# Patient Record
Sex: MALE | Race: WHITE | Employment: OTHER | URBAN - METROPOLITAN AREA
[De-identification: names, ages, dates, MRNs, and addresses within clinical notes are randomized per-mention and may not be internally consistent; named-entity substitution may affect disease eponyms.]

---

## 2018-06-02 ENCOUNTER — OFFICE VISIT (OUTPATIENT)
Dept: URGENT CARE | Facility: RETAIL CLINIC | Age: 43
End: 2018-06-02
Payer: COMMERCIAL

## 2018-06-02 VITALS — TEMPERATURE: 98.3 F | SYSTOLIC BLOOD PRESSURE: 152 MMHG | HEART RATE: 91 BPM | DIASTOLIC BLOOD PRESSURE: 102 MMHG

## 2018-06-02 DIAGNOSIS — S30.861A TICK BITE OF ABDOMEN, INITIAL ENCOUNTER: ICD-10-CM
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
## 2018-06-02 DIAGNOSIS — L08.9 SKIN INFECTION: Primary | ICD-10-CM

## 2018-06-02 DIAGNOSIS — L30.9 DERMATITIS: ICD-10-CM

## 2018-06-02 PROCEDURE — 99203 OFFICE O/P NEW LOW 30 MIN: CPT | Performed by: PHYSICIAN ASSISTANT

## 2018-06-02 RX ORDER — DIAPER,BRIEF,INFANT-TODD,DISP
EACH MISCELLANEOUS 2 TIMES DAILY
COMMUNITY
End: 2021-01-20

## 2018-06-02 RX ORDER — DOXYCYCLINE 100 MG/1
100 CAPSULE ORAL 2 TIMES DAILY
Qty: 20 CAPSULE | Refills: 0 | Status: SHIPPED | OUTPATIENT
Start: 2018-06-02 | End: 2018-06-12

## 2018-06-02 NOTE — PROGRESS NOTES
Chief Complaint   Patient presents with     Insect Bites     found tick inside navel attached monday, now rash around navel since this morning, intermittant itchy rashes since may 5th, no fevers        SUBJECTIVE:  Junior Manzano is a 42 year old male who is here because of a tick bite. He stated it was a large tick/wood tick, not a deer tick. Was attached inside his navel 5 days ago and removed it. [place] had [place] the few days prior (in [facility])  He then noticed a flat red rash in and around his navel since this morning. Does not itch    He has had issues with intermittent itchy pink or red rashes appearing on and off for several days then disappearing on random locations for past 3 weeks on his legs. Appear as raised bumpy grouping. Not sure if related to allergies or not.  Uses topical cortisone and this helps the itch somewhat. No joint aches, body aches, fatigue, weight loss, cough, GI symptoms.    Associated symptoms:  Fever: none  Other symptoms:   Headache no  Sore throat no  Fatigue no  Nausea/vomiting no  Muscle aches no  Joint pain no  Swollen lymph glands no  Stiff neck no      No chronic health issues    Past Medical History:   Diagnosis Date     Anxiety         Current Outpatient Prescriptions   Medication Sig Dispense Refill     hydrocortisone (CORTIZONE-10) 1 % ointment Apply topically 2 times daily          ROS:  CONSTITUTIONAL NEG for: fatigue, fever, malaise or myalgias  ENT/ MOUTH: Neg for ears, nose or throat problems   RESP: Neg for cough or wheezing  GI: Neg for diarrhea or abd pain    OBJECTIVE:  Nontoxic male in no acute distress.  Blood pressure (!) 152/102, pulse 91, temperature 98.3  F (36.8  C), temperature source Oral.  Rash description: #2     Location: umbilicus and surrounding area     Distribution: localized     Color: erythema     Maximum diameter  5cm x 3cm    ASSESSMENT / PLAN  (L08.9) Skin infection  (primary encounter  diagnosis)  (S30.861A,  W57.XXXA) Tick bite of abdomen, initial encounter  Plan: doxycycline (VIBRAMYCIN) 100 MG capsule  Take oral antibiotic 2x a day for 10 days with food  Use sunscreen for 10 days  Please follow up with primary care provider if not improving, worsening or new symptoms or for any adverse reactions to medications.     (L30.9) Dermatitis, history of, on legs  Plan: Hydrocortisone Acetate 2.5 % CREA  Use steroid cream as needed for itchy areas twice daily as needed  Please follow up with primary care provider if not improving, worsening or new symptoms or for any adverse reactions to medications.     Ivon Layton PA-C  [facility] [place] [facility]

## 2018-06-02 NOTE — MR AVS SNAPSHOT
After Visit Summary   6/2/2018    Junior Manzano    MRN: [id number]           Patient Information     Date Of Birth          1975        Visit Information        Provider Department      6/2/2018 11:40 AM Ivon Layton PA-C [facility]        Today's Diagnoses     Skin infection    -  1    Dermatitis          Care Instructions    Take oral antibiotic 2x a day for 10 days with food  Use sunscreen for 10 days    Use steroid cream as needed for itchy areas twice daily as needed  Please follow up with primary care provider if not improving, worsening or new symptoms or for any adverse reactions to medications.             Follow-ups after your visit        Who to contact     You can reach your care team any time of the day by calling [phone].  Notification of test results:  If you have an abnormal lab result, we will notify you by phone as soon as possible.         Additional Information About Your Visit        Care EveryWhere ID     This is your Care EveryWhere ID. This could be used by other organizations to access your [facility] medical records  [id number]        Your Vitals Were     Pulse Temperature                91 98.3  F (36.8  C) (Oral)           Blood Pressure from Last 3 Encounters:   06/02/18 (!) 152/102   06/22/16 126/70   04/04/16 (!) 148/102    Weight from Last 3 Encounters:   06/22/16 201 lb (91.2 kg)   04/04/16 195 lb (88.5 kg)   04/20/15 205 lb (93 kg)              Today, you had the following     No orders found for display         Today's Medication Changes          These changes are accurate as of 6/2/18 12:09 PM.  If you have any questions, ask your nurse or doctor.               Start taking these medicines.        Dose/Directions    doxycycline 100 MG capsule   Commonly known as:  VIBRAMYCIN   Used for:  Skin infection        Dose:  100 mg   Take 1 capsule (100 mg) by mouth 2 times daily for 10 days   Quantity:  20 capsule   Refills:  0        Hydrocortisone Acetate 2.5 % Crea   Used for:  Dermatitis        Dose:  1 Dose   Externally apply 1 Dose topically as needed Apply thin amt to affected areas twice daily as needed for itch   Quantity:  30 g   Refills:  0            Where to get your medicines      These medications were sent to [id number] - [place], [place] - [address]  [address] [address]     Phone:  [phone]     doxycycline 100 MG capsule    Hydrocortisone Acetate 2.5 % Crea                Primary Care Provider Office Phone # Fax #    Giovana Calderon PA-C [phone] [address] [place] [address]        Equal Access to Services     [facility]: Hadii aad bina hadasho Sojaneeali, waaxda luqadaha, qaybta kaalmada adeegyada, angel norman . So [facility] [phone].    ATENCIÓN: Si habla español, tiene a meadows disposición servicios gratuitos de asistencia lingüística. [facility] [phone].    We comply with applicable federal civil rights laws and [place] laws. We do not discriminate on the basis of race, color, national origin, age, disability, sex, sexual orientation, or gender identity.            Thank you!     Thank you for choosing [facility]  for your care. Our goal is always to provide you with excellent care. Hearing back from our patients is one way we can continue to improve our services. Please take a few minutes to complete the written survey that you may receive in the mail after your visit with us. Thank you!             Your Updated Medication List - Protect others around you: Learn how to safely use, store and throw away your medicines at www.disposemymeds.org.          This list is accurate as of 6/2/18 12:09 PM.  Always use your most recent med list.                   Brand Name Dispense Instructions for use Diagnosis    CORTIZONE-10 1 % ointment   Generic drug:  hydrocortisone      Apply topically 2 times daily         doxycycline 100 MG capsule    VIBRAMYCIN    20 capsule    Take 1 capsule (100 mg) by mouth 2 times daily for 10 days    Skin infection       Hydrocortisone Acetate 2.5 % Crea     30 g    Externally apply 1 Dose topically as needed Apply thin amt to affected areas twice daily as needed for itch    Dermatitis

## 2018-06-02 NOTE — PATIENT INSTRUCTIONS
Take oral antibiotic 2x a day for 10 days with food  Use sunscreen for 10 days    Use steroid cream as needed for itchy areas twice daily as needed  Please follow up with primary care provider if not improving, worsening or new symptoms or for any adverse reactions to medications.

## 2018-12-19 ENCOUNTER — OFFICE VISIT (OUTPATIENT)
Dept: URGENT CARE | Facility: RETAIL CLINIC | Age: 43
End: 2018-12-19
Payer: COMMERCIAL

## 2018-12-19 VITALS — TEMPERATURE: 98.2 F | SYSTOLIC BLOOD PRESSURE: 183 MMHG | DIASTOLIC BLOOD PRESSURE: 108 MMHG | HEART RATE: 91 BPM

## 2018-12-19 DIAGNOSIS — J02.9 ACUTE PHARYNGITIS, UNSPECIFIED ETIOLOGY: Primary | ICD-10-CM

## 2018-12-19 LAB — S PYO AG THROAT QL IA.RAPID: NEGATIVE

## 2018-12-19 PROCEDURE — 87081 CULTURE SCREEN ONLY: CPT | Performed by: FAMILY MEDICINE

## 2018-12-19 PROCEDURE — 87880 STREP A ASSAY W/OPTIC: CPT | Mod: QW | Performed by: FAMILY MEDICINE

## 2018-12-19 PROCEDURE — 99213 OFFICE O/P EST LOW 20 MIN: CPT | Performed by: FAMILY MEDICINE

## 2018-12-19 RX ORDER — PENICILLIN V POTASSIUM 500 MG/1
500 TABLET, FILM COATED ORAL 3 TIMES DAILY
Qty: 30 TABLET | Refills: 0 | Status: SHIPPED | OUTPATIENT
Start: 2018-12-19 | End: 2018-12-29

## 2018-12-19 NOTE — PROGRESS NOTES
SUBJECTIVE:  Junior Manzano is a 43 year old male with a chief complaint of sore throat.  Onset of symptoms was 3 day(s) ago.    Course of illness: still present and improving.  Severity mild  Current and Associated symptoms: rash in axilla  Treatment measures tried include None tried.  Predisposing factors include traveling, low sleep and crowds..    Past Medical History:   Diagnosis Date     Anxiety      Current Outpatient Medications   Medication Sig Dispense Refill     penicillin V (VEETID) 500 MG tablet Take 1 tablet (500 mg) by mouth 3 times daily for 10 days 30 tablet 0     RaNITidine HCl (ZANTAC PO)        hydrocortisone (CORTIZONE-10) 1 % ointment Apply topically 2 times daily       Hydrocortisone Acetate 2.5 % CREA Externally apply 1 Dose topically as needed Apply thin amt to affected areas twice daily as needed for itch (Patient not taking: Reported on 12/19/2018) 30 g 0     History   Smoking Status     Never Smoker   Smokeless Tobacco     Never Used       ROS:  Review of systems negative except as stated above.    OBJECTIVE:   BP (!) 183/108 (BP Location: Left arm)   Pulse 91   Temp 98.2  F (36.8  C) (Oral)   GENERAL APPEARANCE: healthy, alert and no distress  EYES: EOMI,  PERRL, conjunctiva clear  HENT: ear canals and TM's normal.  Nose normal.  Pharynx erythematous with some exudate noted.  NECK: supple, non-tender to palpation, no adenopathy noted  RESP: lungs clear to auscultation - no rales, rhonchi or wheezes  CV: regular rates and rhythm, normal S1 S2, no murmur noted  ABDOMEN:  soft, nontender, no HSM or masses and bowel sounds normal  SKIN: folliculitis    Rapid Strep test is negative; await throat culture results.    ASSESSMENT:  Acute pharyngitis, unspecified etiology  Mild folliculitis  Elevated BP today  PLAN: Advised monitor BP, antibiotic oint to folliculitis, Printed Rx for Pen because traveling tomorrow.    Will call with TC results  Symptomatic treat with gargles, lozenges, and  OTC analgesic as needed.   Follow-up with primary care provider if not improving.

## 2018-12-25 LAB
BACTERIA SPEC CULT: NORMAL
SPECIMEN SOURCE: NORMAL

## 2019-09-06 NOTE — PROGRESS NOTES
SUBJECTIVE:   CC: Junior Manzano is an 44 year old male who presents for preventative health visit.     Healthy Habits:     Getting at least 3 servings of Calcium per day:  Yes    Bi-annual eye exam:  Yes    Dental care twice a year:  NO    Sleep apnea or symptoms of sleep apnea:  None    Diet:  Regular (no restrictions)    Frequency of exercise:  2-3 days/week    Duration of exercise:  45-60 minutes    Taking medications regularly:  Not Applicable    Medication side effects:  Not applicable    PHQ-2 Total Score: 0    Additional concerns today:  Yes (discuss BP-has been 130-140s/90s at home, daily aches)    Has had BP elevated intermittently over the years, he thinks stress is a contributing factor, 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  He did have an blood pressure as high as 183/108 earlier this year while in [place].  He has no family history of hypertension per se though his father did require stents recently.  He does not add a lot of salt to his food but does eat out most days of the of the week for the noon meal.  He tries to make good choices.  No substernal, exertional chest pain or shortness of breath.    He noticed a lump on the left rib cage several months ago.  It is getting smaller and it is not painful.  He would like it checked.  His chiropractor thinks it might be a lymph node.                Today's PHQ-2 Score:   PHQ-2 ( 1999 Pfizer) 9/10/2019   Q1: Little interest or pleasure in doing things 0   Q2: Feeling down, depressed or hopeless 0   PHQ-2 Score 0   Q1: Little interest or pleasure in doing things Not at all   Q2: Feeling down, depressed or hopeless Not at all   PHQ-2 Score 0       Abuse: Current or Past(Physical, Sexual or Emotional)- No  Do you feel safe in your environment? Yes    Social History     Tobacco Use     Smoking status: Never Smoker     Smokeless tobacco: Never Used   Substance Use Topics     Alcohol use: Yes     Alcohol/week: 3.0 oz         Alcohol  "Use 9/10/2019   Prescreen: >3 drinks/day or >7 drinks/week? Yes   AUDIT SCORE  6       Last PSA: No results found for: PSA    Reviewed orders with patient. Reviewed health maintenance and updated orders accordingly - Yes  Lab work is in process  Labs reviewed in EPIC  BP Readings from Last 3 Encounters:   09/11/19 (!) 150/90   12/19/18 (!) 183/108   06/02/18 (!) 152/102    Wt Readings from Last 3 Encounters:   09/11/19 88.9 kg (196 lb)   06/22/16 91.2 kg (201 lb)   04/04/16 88.5 kg (195 lb)                    Reviewed and updated as needed this visit by clinical staff  Tobacco  Allergies  Meds         Reviewed and updated as needed this visit by Provider            Review of Systems  CONSTITUTIONAL: NEGATIVE for fever, chills, change in weight  INTEGUMENTARY/SKIN: Lump on left side as above  EYES: NEGATIVE for vision changes or irritation  ENT: NEGATIVE for ear, mouth and throat problems  RESP: NEGATIVE for significant cough or SOB  CV: NEGATIVE for chest pain, palpitations or peripheral edema  GI: Occasional heartburn, particularly if he drinks soda or beer.   male: negative for dysuria, hematuria, decreased urinary stream, erectile dysfunction, urethral discharge  MUSCULOSKELETAL: He does work physically hard at times, he does get some shoulder pain and stiffness of his chest muscles or upper back  NEURO: NEGATIVE for weakness, dizziness or paresthesias  PSYCHIATRIC: He continues to be anxious, he has a lot of stress managing his own business.  We have tried Zoloft and Lexapro in the past with very little improvement.  Currently he uses alcohol few drinks a few times a week to help him relax.  He understands this is not the best treatment    OBJECTIVE:   BP (!) 150/90   Pulse 92   Temp 97.5  F (36.4  C) (Temporal)   Resp 16   Ht 1.715 m (5' 7.5\")   Wt 88.9 kg (196 lb)   BMI 30.24 kg/m      Physical Exam  GENERAL: healthy, alert and no distress  EYES: Eyes grossly normal to inspection, PERRL and " "conjunctivae and sclerae normal  HENT: ear canals and TM's normal, nose and mouth without ulcers or lesions  NECK: no adenopathy, no asymmetry, masses, or scars and thyroid normal to palpation  RESP: lungs clear to auscultation - no rales, rhonchi or wheezes  CV: regular rate and rhythm, normal S1 S2, no S3 or S4, no murmur, click or rub, no peripheral edema and peripheral pulses strong  ABDOMEN: soft, nontender, no hepatosplenomegaly, no masses and bowel sounds normal  MS: no gross musculoskeletal defects noted, no edema  SKIN: Left flank subcutaneous 1 cm in diameter ill-defined mass overlying skin is normal  NEURO: Normal strength and tone, mentation intact and speech normal  PSYCH: mentation appears normal, affect normal/bright    Diagnostic Test Results:  Labs reviewed in Epic  No results found for this or any previous visit (from the past 24 hour(s)).    ASSESSMENT/PLAN:   1. Routine general medical examination at a health care facility  Healthy male with elevated blood pressure  - CBC with platelets    2. Hypertension goal BP (blood pressure) < 140/90  New diagnosis, we will start with low-dose lisinopril, discussed common side effects.  He will have a blood pressure recheck in the pharmacy in 2 weeks he will continue to check blood pressures at home as well    Recheck in 6 months  - Basic metabolic panel  - lisinopril (PRINIVIL/ZESTRIL) 5 MG tablet; Take 1 tablet (5 mg) by mouth daily  Dispense: 30 tablet; Refill: 5    3. CARDIOVASCULAR SCREENING; LDL GOAL LESS THAN 160    - Lipid panel reflex to direct LDL Fasting    4. Lipoma of torso  He will observe, he needs follow-up with this is getting larger or becoming painful      COUNSELING:   Reviewed preventive health counseling, as reflected in patient instructions    Estimated body mass index is 30.24 kg/m  as calculated from the following:    Height as of this encounter: 1.715 m (5' 7.5\").    Weight as of this encounter: 88.9 kg (196 lb).     Weight " management plan: Discussed healthy diet and exercise guidelines     reports that he has never smoked. He has never used smokeless tobacco.      Counseling Resources:  ATP IV Guidelines  Pooled Cohorts Equation Calculator  FRAX Risk Assessment  ICSI Preventive Guidelines  Dietary Guidelines for Americans, 2010  USDA's MyPlate  ASA Prophylaxis  Lung CA Screening    Giovana Calderon PA-C  [facility] [place]  Answers for HPI/ROS submitted by the patient on 9/10/2019   Annual Exam:  If you checked off any problems, how difficult have these problems made it for you to do your work, take care of things at home, or get along with other people?: Not difficult at all  PHQ9 TOTAL SCORE: 1  CHERRY 7 TOTAL SCORE: 7

## 2019-09-10 ASSESSMENT — ENCOUNTER SYMPTOMS
NAUSEA: 0
COUGH: 0
DIARRHEA: 0
CHILLS: 0
DIZZINESS: 0
HEMATOCHEZIA: 0
WEAKNESS: 0
NERVOUS/ANXIOUS: 1
FEVER: 0
EYE PAIN: 0
FREQUENCY: 0
HEMATURIA: 0
PALPITATIONS: 0
HEADACHES: 0
CONSTIPATION: 0
SHORTNESS OF BREATH: 0
SORE THROAT: 0
HEARTBURN: 1
PARESTHESIAS: 0
ARTHRALGIAS: 0
MYALGIAS: 1
ABDOMINAL PAIN: 0
DYSURIA: 0
JOINT SWELLING: 0

## 2019-09-10 ASSESSMENT — PATIENT HEALTH QUESTIONNAIRE - PHQ9
SUM OF ALL RESPONSES TO PHQ QUESTIONS 1-9: 1
SUM OF ALL RESPONSES TO PHQ QUESTIONS 1-9: 1
10. IF YOU CHECKED OFF ANY PROBLEMS, HOW DIFFICULT HAVE THESE PROBLEMS MADE IT FOR YOU TO DO YOUR WORK, TAKE CARE OF THINGS AT HOME, OR GET ALONG WITH OTHER PEOPLE: NOT DIFFICULT AT ALL

## 2019-09-10 ASSESSMENT — ANXIETY QUESTIONNAIRES
GAD7 TOTAL SCORE: 7
6. BECOMING EASILY ANNOYED OR IRRITABLE: SEVERAL DAYS
7. FEELING AFRAID AS IF SOMETHING AWFUL MIGHT HAPPEN: SEVERAL DAYS
GAD7 TOTAL SCORE: 7
5. BEING SO RESTLESS THAT IT IS HARD TO SIT STILL: SEVERAL DAYS
1. FEELING NERVOUS, ANXIOUS, OR ON EDGE: SEVERAL DAYS
2. NOT BEING ABLE TO STOP OR CONTROL WORRYING: SEVERAL DAYS
7. FEELING AFRAID AS IF SOMETHING AWFUL MIGHT HAPPEN: SEVERAL DAYS
GAD7 TOTAL SCORE: 7
4. TROUBLE RELAXING: SEVERAL DAYS
3. WORRYING TOO MUCH ABOUT DIFFERENT THINGS: SEVERAL DAYS

## 2019-09-11 ENCOUNTER — OFFICE VISIT (OUTPATIENT)
Dept: FAMILY MEDICINE | Facility: OTHER | Age: 44
End: 2019-09-11
Payer: COMMERCIAL

## 2019-09-11 VITALS
RESPIRATION RATE: 16 BRPM | DIASTOLIC BLOOD PRESSURE: 84 MMHG | WEIGHT: 196 LBS | TEMPERATURE: 97.5 F | HEIGHT: 68 IN | BODY MASS INDEX: 29.7 KG/M2 | HEART RATE: 92 BPM | SYSTOLIC BLOOD PRESSURE: 138 MMHG

## 2019-09-11 DIAGNOSIS — Z23 NEED FOR PROPHYLACTIC VACCINATION AND INOCULATION AGAINST INFLUENZA: ICD-10-CM

## 2019-09-11 DIAGNOSIS — Z00.00 ROUTINE GENERAL MEDICAL EXAMINATION AT A HEALTH CARE FACILITY: Primary | ICD-10-CM

## 2019-09-11 DIAGNOSIS — D17.1 LIPOMA OF TORSO: ICD-10-CM

## 2019-09-11 DIAGNOSIS — Z13.6 CARDIOVASCULAR SCREENING; LDL GOAL LESS THAN 160: ICD-10-CM

## 2019-09-11 DIAGNOSIS — I10 HYPERTENSION GOAL BP (BLOOD PRESSURE) < 140/90: ICD-10-CM

## 2019-09-11 LAB
ANION GAP SERPL CALCULATED.3IONS-SCNC: 6 MMOL/L (ref 3–14)
BUN SERPL-MCNC: 11 MG/DL (ref 7–30)
CALCIUM SERPL-MCNC: 9 MG/DL (ref 8.5–10.1)
CHLORIDE SERPL-SCNC: 107 MMOL/L (ref 94–109)
CHOLEST SERPL-MCNC: 176 MG/DL
CO2 SERPL-SCNC: 27 MMOL/L (ref 20–32)
CREAT SERPL-MCNC: 1.04 MG/DL (ref 0.66–1.25)
ERYTHROCYTE [DISTWIDTH] IN BLOOD BY AUTOMATED COUNT: 13.4 % (ref 10–15)
GFR SERPL CREATININE-BSD FRML MDRD: 86 ML/MIN/{1.73_M2}
GLUCOSE SERPL-MCNC: 89 MG/DL (ref 70–99)
HCT VFR BLD AUTO: 44.5 % (ref 40–53)
HDLC SERPL-MCNC: 65 MG/DL
HGB BLD-MCNC: 15.6 G/DL (ref 13.3–17.7)
LDLC SERPL CALC-MCNC: 95 MG/DL
MCH RBC QN AUTO: 29.4 PG (ref 26.5–33)
MCHC RBC AUTO-ENTMCNC: 35.1 G/DL (ref 31.5–36.5)
MCV RBC AUTO: 84 FL (ref 78–100)
NONHDLC SERPL-MCNC: 111 MG/DL
PLATELET # BLD AUTO: 181 10E9/L (ref 150–450)
POTASSIUM SERPL-SCNC: 4.4 MMOL/L (ref 3.4–5.3)
RBC # BLD AUTO: 5.3 10E12/L (ref 4.4–5.9)
SODIUM SERPL-SCNC: 140 MMOL/L (ref 133–144)
TRIGL SERPL-MCNC: 82 MG/DL
WBC # BLD AUTO: 6 10E9/L (ref 4–11)

## 2019-09-11 PROCEDURE — 90686 IIV4 VACC NO PRSV 0.5 ML IM: CPT | Performed by: PHYSICIAN ASSISTANT

## 2019-09-11 PROCEDURE — 80061 LIPID PANEL: CPT | Performed by: PHYSICIAN ASSISTANT

## 2019-09-11 PROCEDURE — 36415 COLL VENOUS BLD VENIPUNCTURE: CPT | Performed by: PHYSICIAN ASSISTANT

## 2019-09-11 PROCEDURE — 90471 IMMUNIZATION ADMIN: CPT | Performed by: PHYSICIAN ASSISTANT

## 2019-09-11 PROCEDURE — 99213 OFFICE O/P EST LOW 20 MIN: CPT | Mod: 25 | Performed by: PHYSICIAN ASSISTANT

## 2019-09-11 PROCEDURE — 80048 BASIC METABOLIC PNL TOTAL CA: CPT | Performed by: PHYSICIAN ASSISTANT

## 2019-09-11 PROCEDURE — 85027 COMPLETE CBC AUTOMATED: CPT | Performed by: PHYSICIAN ASSISTANT

## 2019-09-11 PROCEDURE — 99386 PREV VISIT NEW AGE 40-64: CPT | Mod: 25 | Performed by: PHYSICIAN ASSISTANT

## 2019-09-11 RX ORDER — LISINOPRIL 5 MG/1
5 TABLET ORAL DAILY
Qty: 30 TABLET | Refills: 5 | Status: SHIPPED | OUTPATIENT
Start: 2019-09-11 | End: 2020-03-11

## 2019-09-11 ASSESSMENT — MIFFLIN-ST. JEOR: SCORE: 1735.61

## 2019-09-11 ASSESSMENT — PAIN SCALES - GENERAL: PAINLEVEL: NO PAIN (0)

## 2019-09-11 ASSESSMENT — PATIENT HEALTH QUESTIONNAIRE - PHQ9: SUM OF ALL RESPONSES TO PHQ QUESTIONS 1-9: 1

## 2019-09-11 ASSESSMENT — ANXIETY QUESTIONNAIRES: GAD7 TOTAL SCORE: 7

## 2019-09-24 ENCOUNTER — ALLIED HEALTH/NURSE VISIT (OUTPATIENT)
Dept: FAMILY MEDICINE | Facility: OTHER | Age: 44
End: 2019-09-24
Payer: COMMERCIAL

## 2019-09-24 VITALS — HEART RATE: 88 BPM | SYSTOLIC BLOOD PRESSURE: 124 MMHG | DIASTOLIC BLOOD PRESSURE: 68 MMHG

## 2019-09-24 DIAGNOSIS — I10 HYPERTENSION GOAL BP (BLOOD PRESSURE) < 140/90: Primary | ICD-10-CM

## 2019-09-24 PROCEDURE — 99207 ZZC NO CHARGE NURSE ONLY: CPT

## 2019-09-24 NOTE — NURSING NOTE
Chief Complaint   Patient presents with     Allied Health Visit     BP Check     Junior Manzano is a 44 year old patient who comes in today for a Blood Pressure check.  Initial BP:  /68   Pulse 88      88  Disposition: follow-up as previously indicated by provider    Sophie Leyva CMA (AAMA)       Attending Attestation (For Attendings USE Only)...

## 2020-01-08 ENCOUNTER — TELEPHONE (OUTPATIENT)
Dept: FAMILY MEDICINE | Facility: OTHER | Age: 45
End: 2020-01-08

## 2020-01-08 NOTE — TELEPHONE ENCOUNTER
Spoke with pt and he stated the paper he received about this states medical record print out and that is all. So he said to print most recent visit with Giovana and if it needs to be different he will let us know. Pt informed he can pick this up this afternoon. This has been printed along with a letter, awaiting provider signature. When done place up front and close.    Sophie Leyva CMA ([facility])

## 2020-01-08 NOTE — LETTER
[facility]  [address] [facility]  [place] [address]  Phone: [phone]    2020        Junior NG Natacha  [address]  [place] [address]          To whom it may concern:    RE: Junior NG Natacha : 1975      This patient has been under my care for several years now. He had his most recent visit with me 19 and have attached that visit note with this letter.     Please contact me for questions or concerns.      Sincerely,        Giovana Calderon PA-C

## 2020-01-08 NOTE — TELEPHONE ENCOUNTER
Signed and placed up front for pt to .    Sophie Leyva CMA ([facility])

## 2020-01-08 NOTE — TELEPHONE ENCOUNTER
Reason for Call:  Other SSN letter needed    Detailed comments: Patient needs to get his social security number card. He needs paperwork that has the clinic letter head, patient's name,  and visit record and for it to be signed by his PCP and sealed in an envelope. Please have at  as soon as possible for pickup.     Phone Number Patient can be reached at: Home number on file [phone] (home) Please call when ready    Best Time: any    Can we leave a detailed message on this number? YES    Call taken on 2020 at 9:33 AM by Deepa Hardy

## 2020-03-06 NOTE — PROGRESS NOTES
Subjective     Junior Manzano is a 44 year old male who presents to clinic today for the following health issues:    History of Present Illness        Hypertension: He presents for follow up of hypertension.  He does check blood pressure  regularly outside of the clinic. Outpatient blood pressures have not been over 140/90. He follows a low salt diet.     He eats 2-3 servings of fruits and vegetables daily.He consumes 1 sweetened beverage(s) daily.He exercises with enough effort to increase his heart rate 30 to 60 minutes per day.  He exercises with enough effort to increase his heart rate 6 days per week.   He is taking medications regularly.     Patient discontinued his lisinopril 6 to 7 weeks ago.  He has started to workout at [employer].  It is a combination of kickboxing and strength training.  He is also been improving his diet.  He has lost weight as well.  He had been monitoring his blood pressure at home and noticed that it was getting very low.  His resting heart rate has also reduced considerably.  It is now 57.  He feels great he had been having some low back pain intermittently which has now resolved.  He wants to know if I think he should resume his blood pressure meds.      Current Outpatient Medications   Medication Sig Dispense Refill     Pseudoephedrine-Ibuprofen (ADVIL COLD/SINUS PO)        hydrocortisone (CORTIZONE-10) 1 % ointment Apply topically 2 times daily       RaNITidine HCl (ZANTAC PO)        BP Readings from Last 3 Encounters:   03/11/20 118/74   09/24/19 124/68   09/11/19 138/84    Wt Readings from Last 3 Encounters:   03/11/20 87.5 kg (192 lb 14.4 oz)   09/11/19 88.9 kg (196 lb)   06/22/16 91.2 kg (201 lb)                    Reviewed and updated as needed this visit by Provider         Review of Systems   ROS COMP: CONSTITUTIONAL: NEGATIVE for fever, chills, change in weight  ENT/MOUTH: NEGATIVE for ear, mouth and throat problems  RESP: NEGATIVE for significant cough or  "SOB  CV: NEGATIVE for chest pain, palpitations or peripheral edema  GI: NEGATIVE for nausea, abdominal pain, heartburn, or change in bowel habits      Objective    /74   Pulse 60   Temp 97.1  F (36.2  C) (Temporal)   Resp 16   Ht 1.715 m (5' 7.5\")   Wt 87.5 kg (192 lb 14.4 oz)   BMI 29.77 kg/m    Body mass index is 29.77 kg/m .  Physical Exam   GENERAL: healthy, alert and no distress  NECK: no adenopathy, no asymmetry, masses, or scars and thyroid normal to palpation  RESP: lungs clear to auscultation - no rales, rhonchi or wheezes  CV: regular rate and rhythm, normal S1 S2, no S3 or S4, no murmur, click or rub, no peripheral edema and peripheral pulses strong  MS: no gross musculoskeletal defects noted, no edema  PSYCH: mentation appears normal, affect normal/bright    Diagnostic Test Results:  Labs reviewed in Epic  No results found for any visits on 03/11/20.        Assessment & Plan     1. CARDIOVASCULAR SCREENING; LDL GOAL LESS THAN 160  Lipids up-to-date, will be due again in September  - Lipid panel reflex to direct LDL Fasting; Future    2. Hypertension goal BP (blood pressure) < 140/90  Due to weight loss, improve cardiovascular health it is okay for him to discontinue lisinopril.  He will continue to exercise and eat healthy.  We will reassess in 6 months at his physical exam if he continues to do well from a blood pressure standpoint I may resolve hypertension from his problem list.  - Basic metabolic panel; Future     BMI:   Estimated body mass index is 29.77 kg/m  as calculated from the following:    Height as of this encounter: 1.715 m (5' 7.5\").    Weight as of this encounter: 87.5 kg (192 lb 14.4 oz).   Weight management plan: Discussed healthy diet and exercise guidelines        This chart documentation was completed in part with Dragon voice recognition software.  Documentation is reviewed after completion, however, some words and grammatical errors may remain.  Giovana Calderon PA-C  "     Return in about 6 months (around 9/11/2020) for Physical Exam.    Giovana Calderon PA-C  [facility]

## 2020-03-11 ENCOUNTER — OFFICE VISIT (OUTPATIENT)
Dept: FAMILY MEDICINE | Facility: OTHER | Age: 45
End: 2020-03-11
Payer: COMMERCIAL

## 2020-03-11 VITALS
BODY MASS INDEX: 29.24 KG/M2 | WEIGHT: 192.9 LBS | DIASTOLIC BLOOD PRESSURE: 74 MMHG | HEART RATE: 60 BPM | TEMPERATURE: 97.1 F | HEIGHT: 68 IN | SYSTOLIC BLOOD PRESSURE: 118 MMHG | RESPIRATION RATE: 16 BRPM

## 2020-03-11 DIAGNOSIS — Z13.6 CARDIOVASCULAR SCREENING; LDL GOAL LESS THAN 160: Primary | ICD-10-CM

## 2020-03-11 DIAGNOSIS — I10 HYPERTENSION GOAL BP (BLOOD PRESSURE) < 140/90: ICD-10-CM

## 2020-03-11 PROCEDURE — 99213 OFFICE O/P EST LOW 20 MIN: CPT | Performed by: PHYSICIAN ASSISTANT

## 2020-03-11 ASSESSMENT — MIFFLIN-ST. JEOR: SCORE: 1721.55

## 2020-09-09 NOTE — PROGRESS NOTES
SUBJECTIVE:   CC: Junior Manzano is an 45 year old male who presents for preventative health visit.     Healthy Habits:     Getting at least 3 servings of Calcium per day:  Yes    Bi-annual eye exam:  Yes    Dental care twice a year:  Yes    Sleep apnea or symptoms of sleep apnea:  None    Diet:  Regular (no restrictions)    Frequency of exercise:  4-5 days/week    Duration of exercise:  30-45 minutes    Taking medications regularly:  Yes    PHQ-2 Total Score: 0    Additional concerns today:  No    He continues to exercise at gym several days a week.  His diet is healthier and he is maintaining his weight loss.  checks bp at home, they have been 130s/90s.  He is not sure his monitor has been accurate though.        Today's PHQ-2 Score:   PHQ-2 ( 1999 Pfizer) 9/13/2020   Q1: Little interest or pleasure in doing things 0   Q2: Feeling down, depressed or hopeless 0   PHQ-2 Score 0   Q1: Little interest or pleasure in doing things Not at all   Q2: Feeling down, depressed or hopeless Not at all   PHQ-2 Score 0       Abuse: Current or Past(Physical, Sexual or Emotional)- No  Do you feel safe in your environment? Yes        Social History     Tobacco Use     Smoking status: Never Smoker     Smokeless tobacco: Never Used   Substance Use Topics     Alcohol use: Yes     Alcohol/week: 5.0 standard drinks     If you drink alcohol do you typically have >3 drinks per day or >7 drinks per week? No    Alcohol Use 9/13/2020   Prescreen: >3 drinks/day or >7 drinks/week? Yes   AUDIT SCORE  6       Last PSA: No results found for: PSA    Reviewed orders with patient. Reviewed health maintenance and updated orders accordingly - Yes  Lab work is in process  Labs reviewed in EPIC  BP Readings from Last 3 Encounters:   09/14/20 124/84   03/11/20 118/74   09/24/19 124/68    Wt Readings from Last 3 Encounters:   09/14/20 86.2 kg (190 lb)   03/11/20 87.5 kg (192 lb 14.4 oz)   09/11/19 88.9 kg (196 lb)                  Patient Active  Problem List   Diagnosis     Sprain and strain of unspecified site of knee and leg     Abdominal pain, right lower quadrant     Generalized anxiety disorder     CARDIOVASCULAR SCREENING; LDL GOAL LESS THAN 160     Incarcerated umbilical hernia     Hypertension goal BP (blood pressure) < 140/90     Lipoma of torso     Past Surgical History:   Procedure Laterality Date     HERNIORRHAPHY UMBILICAL N/A 4/9/2015    Procedure: HERNIORRHAPHY UMBILICAL;  Surgeon: Elias Bland MD;  Location: [facility] OR       Social History     Tobacco Use     Smoking status: Never Smoker     Smokeless tobacco: Never Used   Substance Use Topics     Alcohol use: Yes     Alcohol/week: 5.0 standard drinks     Family History   Problem Relation Age of Onset     C.A.D. Maternal Grandfather         age 67 MI     Other Cancer Father          Current Outpatient Medications   Medication Sig Dispense Refill     Pseudoephedrine-Ibuprofen (ADVIL COLD/SINUS PO)        hydrocortisone (CORTIZONE-10) 1 % ointment Apply topically 2 times daily       RaNITidine HCl (ZANTAC PO)        Allergies   Allergen Reactions     Banana      Throat feels swollen.Also cantelope- same feeling.     Flonase [Fluticasone Propionate] Difficulty breathing       Reviewed and updated as needed this visit by clinical staff  Tobacco  Allergies  Meds  Med Hx  Surg Hx  Fam Hx  Soc Hx        Reviewed and updated as needed this visit by Provider            Review of Systems   Constitutional: Negative for chills and fever.   HENT: Negative for congestion, ear pain, hearing loss and sore throat.    Eyes: Negative for pain and visual disturbance.   Respiratory: Negative for cough and shortness of breath.    Cardiovascular: Negative for chest pain, palpitations and peripheral edema.   Gastrointestinal: Negative for abdominal pain, constipation, diarrhea, heartburn, hematochezia and nausea.   Genitourinary: Negative for discharge, dysuria, frequency, genital sores, hematuria, impotence  "and urgency.   Musculoskeletal: Negative for arthralgias, joint swelling and myalgias.   Skin: Negative for rash.   Neurological: Negative for dizziness, weakness, headaches and paresthesias.   Psychiatric/Behavioral: Negative for mood changes. The patient is not nervous/anxious.      He requests a covid antibody test.  He has been exposed a few times in the past several months.  He has remained asymptomatic.    He gets what he thinks are muscle aches, they last a few days and resolve. His exercise routines are pretty strenuous.    OBJECTIVE:   /84   Pulse 74   Temp 97  F (36.1  C) (Temporal)   Resp 16   Ht 1.715 m (5' 7.5\")   Wt 86.2 kg (190 lb)   SpO2 99%   BMI 29.32 kg/m      Physical Exam  GENERAL: healthy, alert and no distress  EYES: Eyes grossly normal to inspection, PERRL and conjunctivae and sclerae normal  HENT: ear canals and TM's normal, nose and mouth without ulcers or lesions  NECK: no adenopathy, no asymmetry, masses, or scars and thyroid normal to palpation  RESP: lungs clear to auscultation - no rales, rhonchi or wheezes  CV: regular rate and rhythm, normal S1 S2, no S3 or S4, no murmur, click or rub, no peripheral edema and peripheral pulses strong  ABDOMEN: soft, nontender, no hepatosplenomegaly, no masses and bowel sounds normal  MS: no gross musculoskeletal defects noted, no edema  SKIN: no suspicious lesions or rashes  NEURO: Normal strength and tone, mentation intact and speech normal  PSYCH: mentation appears normal, affect normal/bright    Diagnostic Test Results:  Labs reviewed in Epic  No results found for this or any previous visit (from the past 24 hour(s)).    ASSESSMENT/PLAN:   1. Routine general medical examination at a health care facility  Annual exams        3. Hypertension goal BP (blood pressure) < 140/90  At goal without meds, he will continue healthy low salt diet, and exercise routines  - Basic metabolic panel    4. CARDIOVASCULAR SCREENING; LDL GOAL LESS THAN " "160    - Lipid panel reflex to direct LDL Fasting    5. Screening for viral disease  Pt request  - COVID-19 Virus (Coronavirus) Antibody & Titer Reflex; Future    6. Need for vaccination  Update   - INFLUENZA VACCINE IM > 6 MONTHS VALENT IIV4 [98351]    COUNSELING:   Reviewed preventive health counseling, as reflected in patient instructions    Estimated body mass index is 29.32 kg/m  as calculated from the following:    Height as of this encounter: 1.715 m (5' 7.5\").    Weight as of this encounter: 86.2 kg (190 lb).     Weight management plan: Discussed healthy diet and exercise guidelines    He reports that he has never smoked. He has never used smokeless tobacco.      Counseling Resources:  ATP IV Guidelines  Pooled Cohorts Equation Calculator  FRAX Risk Assessment  ICSI Preventive Guidelines  Dietary Guidelines for Americans, 2010  USDA's MyPlate  ASA Prophylaxis  Lung CA Screening    Giovana Calderon PA-C  [facility]  "

## 2020-09-13 ASSESSMENT — ENCOUNTER SYMPTOMS
COUGH: 0
HEADACHES: 0
PALPITATIONS: 0
PARESTHESIAS: 0
NAUSEA: 0
ABDOMINAL PAIN: 0
ARTHRALGIAS: 0
DIARRHEA: 0
JOINT SWELLING: 0
WEAKNESS: 0
FREQUENCY: 0
CHILLS: 0
SHORTNESS OF BREATH: 0
EYE PAIN: 0
HEMATOCHEZIA: 0
HEARTBURN: 0
NERVOUS/ANXIOUS: 0
MYALGIAS: 0
CONSTIPATION: 0
HEMATURIA: 0
DYSURIA: 0
DIZZINESS: 0
FEVER: 0
SORE THROAT: 0

## 2020-09-14 ENCOUNTER — OFFICE VISIT (OUTPATIENT)
Dept: FAMILY MEDICINE | Facility: OTHER | Age: 45
End: 2020-09-14
Payer: COMMERCIAL

## 2020-09-14 VITALS
TEMPERATURE: 97 F | RESPIRATION RATE: 16 BRPM | HEART RATE: 74 BPM | DIASTOLIC BLOOD PRESSURE: 84 MMHG | BODY MASS INDEX: 28.79 KG/M2 | OXYGEN SATURATION: 99 % | WEIGHT: 190 LBS | SYSTOLIC BLOOD PRESSURE: 124 MMHG | HEIGHT: 68 IN

## 2020-09-14 DIAGNOSIS — Z13.6 CARDIOVASCULAR SCREENING; LDL GOAL LESS THAN 160: ICD-10-CM

## 2020-09-14 DIAGNOSIS — I10 HYPERTENSION GOAL BP (BLOOD PRESSURE) < 140/90: ICD-10-CM

## 2020-09-14 DIAGNOSIS — Z00.00 ROUTINE GENERAL MEDICAL EXAMINATION AT A HEALTH CARE FACILITY: Primary | ICD-10-CM

## 2020-09-14 DIAGNOSIS — Z23 NEED FOR VACCINATION: ICD-10-CM

## 2020-09-14 DIAGNOSIS — Z11.59 SCREENING FOR VIRAL DISEASE: ICD-10-CM

## 2020-09-14 LAB
ANION GAP SERPL CALCULATED.3IONS-SCNC: 4 MMOL/L (ref 3–14)
BUN SERPL-MCNC: 17 MG/DL (ref 7–30)
CALCIUM SERPL-MCNC: 9.1 MG/DL (ref 8.5–10.1)
CHLORIDE SERPL-SCNC: 106 MMOL/L (ref 94–109)
CHOLEST SERPL-MCNC: 190 MG/DL
CO2 SERPL-SCNC: 30 MMOL/L (ref 20–32)
CREAT SERPL-MCNC: 1.11 MG/DL (ref 0.66–1.25)
GFR SERPL CREATININE-BSD FRML MDRD: 79 ML/MIN/{1.73_M2}
GLUCOSE SERPL-MCNC: 99 MG/DL (ref 70–99)
HDLC SERPL-MCNC: 80 MG/DL
LDLC SERPL CALC-MCNC: 100 MG/DL
NONHDLC SERPL-MCNC: 110 MG/DL
POTASSIUM SERPL-SCNC: 4.3 MMOL/L (ref 3.4–5.3)
SODIUM SERPL-SCNC: 140 MMOL/L (ref 133–144)
TRIGL SERPL-MCNC: 48 MG/DL

## 2020-09-14 PROCEDURE — 90686 IIV4 VACC NO PRSV 0.5 ML IM: CPT | Performed by: PHYSICIAN ASSISTANT

## 2020-09-14 PROCEDURE — 80061 LIPID PANEL: CPT | Performed by: PHYSICIAN ASSISTANT

## 2020-09-14 PROCEDURE — 90471 IMMUNIZATION ADMIN: CPT | Performed by: PHYSICIAN ASSISTANT

## 2020-09-14 PROCEDURE — 36415 COLL VENOUS BLD VENIPUNCTURE: CPT | Performed by: PHYSICIAN ASSISTANT

## 2020-09-14 PROCEDURE — 80048 BASIC METABOLIC PNL TOTAL CA: CPT | Performed by: PHYSICIAN ASSISTANT

## 2020-09-14 PROCEDURE — 99396 PREV VISIT EST AGE 40-64: CPT | Mod: 25 | Performed by: PHYSICIAN ASSISTANT

## 2020-09-14 ASSESSMENT — ENCOUNTER SYMPTOMS
HEARTBURN: 0
ARTHRALGIAS: 0
FEVER: 0
HEMATOCHEZIA: 0
DYSURIA: 0
SHORTNESS OF BREATH: 0
CHILLS: 0
COUGH: 0
DIARRHEA: 0
NERVOUS/ANXIOUS: 0
EYE PAIN: 0
WEAKNESS: 0
PALPITATIONS: 0
JOINT SWELLING: 0
DIZZINESS: 0
NAUSEA: 0
MYALGIAS: 0
PARESTHESIAS: 0
ABDOMINAL PAIN: 0
HEMATURIA: 0
HEADACHES: 0
CONSTIPATION: 0
SORE THROAT: 0
FREQUENCY: 0

## 2020-09-14 ASSESSMENT — MIFFLIN-ST. JEOR: SCORE: 1703.39

## 2021-01-13 ENCOUNTER — TRANSFERRED RECORDS (OUTPATIENT)
Dept: HEALTH INFORMATION MANAGEMENT | Facility: CLINIC | Age: 46
End: 2021-01-13

## 2021-01-13 LAB
CREAT SERPL-MCNC: 1.08 MG/DL (ref 0.72–1.25)
GFR SERPL CREATININE-BSD FRML MDRD: >60 ML/MIN/1.73M2
GLUCOSE SERPL-MCNC: 197 MG/DL (ref 65–100)
HBA1C MFR BLD: 5.3 % (ref 0–5.7)
INR PPP: 1.1
POTASSIUM SERPL-SCNC: 3.7 MMOL/L (ref 3.5–5)

## 2021-01-18 ENCOUNTER — TRANSFERRED RECORDS (OUTPATIENT)
Dept: HEALTH INFORMATION MANAGEMENT | Facility: CLINIC | Age: 46
End: 2021-01-18

## 2021-01-19 ENCOUNTER — TRANSFERRED RECORDS (OUTPATIENT)
Dept: HEALTH INFORMATION MANAGEMENT | Facility: CLINIC | Age: 46
End: 2021-01-19

## 2021-01-19 NOTE — PROGRESS NOTES
Assessment & Plan     Vertebral artery dissection (H)  He has follow-up planned with neurology early in March, if he has any worsening or changes to his symptoms he will call 911 immediately he is to be resting and is avoiding any strenuous activity at this time.  - PHYSICAL THERAPY REFERRAL; Future  - OCCUPATIONAL THERAPY REFERRAL; Future    Stroke, Wallenberg's syndrome  Symptoms are improving, we will have him follow-up with physical therapy and Occupational Therapy in order to continue his recovery  - PHYSICAL THERAPY REFERRAL; Future  - OCCUPATIONAL THERAPY REFERRAL; Future    Cerebral infarction due to embolism of left cerebellar artery (H)  Pain is improving, I will get him 10 more pills to get him through the weekend he is using Tylenol mostly for pain control at this point  - HYDROmorphone (DILAUDID) 2 MG tablet; Take 1 tablet (2 mg) by mouth every 4 hours as needed for moderate to severe pain  - PHYSICAL THERAPY REFERRAL; Future  - OCCUPATIONAL THERAPY REFERRAL; Future    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  Improving, further treatment with PT OT  - PHYSICAL THERAPY REFERRAL; Future  - OCCUPATIONAL THERAPY REFERRAL; Future    Nonintractable headache, unspecified chronicity pattern, unspecified headache type  This is improving as well  - HYDROmorphone (DILAUDID) 2 MG tablet; Take 1 tablet (2 mg) by mouth every 4 hours as needed for moderate to severe pain  - PHYSICAL THERAPY REFERRAL; Future  - OCCUPATIONAL THERAPY REFERRAL; Future    Hiccups  Has not been an issue today, he will use baclofen as needed  - Baclofen (LIORESAL) 5 MG tablet; Take 1-2 tablets (5-10 mg) by mouth 3 times daily  - PHYSICAL THERAPY REFERRAL; Future  - OCCUPATIONAL THERAPY REFERRAL; Future                       Return in about 6 weeks (around 3/3/2021), or if symptoms worsen or fail to improve.    Giovana Calderon PA-C  [facility] SID Jose is a 45 year old who presents to clinic today for  the following health issues     History of Present Illness       He eats 0-1 servings of fruits and vegetables daily.He consumes 1 sweetened beverage(s) daily.He exercises with enough effort to increase his heart rate 30 to 60 minutes per day.    He is taking medications regularly.           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 No   Problems taking medications regularly:  None  Medication changes since discharge: They received a prescription for baclofen last night when they called the ER to discuss his persisting hiccups.  He had been prescribed Thorazine which were not helpful and there was concern for QT prolongation with persisting use.  He was prescribed baclofen.  He took 1 pill for his hiccups and they immediately went away, patient and his wife do not believe they resolve because of the med.  They are requesting more to have on hand in the event he needs to continue to use them.  His hiccups stopped when he ate ice cream.  Gabapentin is another option going forward should his hiccups continue to be an issue  Problems adhering to non-medication therapy:  None    Summary of hospitalization:  CareEverywhere information obtained and reviewed  Diagnostic Tests/Treatments reviewed.  Follow up needed: Physical therapy occupational therapy and appointment with neurology  Other Healthcare Providers Involved in Patient s Care:         Specialist appointment - Neurology and occupational therapy and Physical Therapy  Update since discharge: improved.     Is most bothersome symptom had been  "hiccups up until today.  He describes a deep hiccup that should his entire body.  These deep pickups increased his head ache and his neck pain.  He did have an ER visit after his discharge to assess his hiccups.  Yesterday his headache and neck pain were much improved.  Today he took a Dilaudid when he woke up at 730 for headaches and neck pain and then another Tylenol at 1230.  He feels that his headaches and neck pain are improving over time.  He states they are \"much better\" than what they were.  They were nearly unbearable when he was in the hospital.  He has noticed that his face and hands will tingle only if he is up walking around that symptom goes away entirely if he is at rest.  It is not progressive in any way.  He was diagnosed with a sinusitis on imaging during his hospital stay.  He has completed his Augmentin prescription.  He tolerated it well without side effect.  He has mainly been resting.  He does have a walker that he uses to walk around the home as he can be unsteady on his feet at times.  Have not schedule an appointment with physical therapy or occupational therapy yet, they were referred to [facility] although for insurance purposes they need to be seen through [facility].  His appetite is still reduced, he is eating because he knows he needs to give his body healthy nutrients.  He slept well last night he took a Dilaudid before bed.  He is not having any abdominal pain or hard stools recently but admits he is not having normal bowel movements.  He has not been taking a stool softener.  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  He did not have any true.  He reports he did have a " headache approximately a week before his dissection and stroke as well.  He is taking his Plavix as prescribed      Post Discharge Medication Reconciliation: discharge medications reconciled, continue medications without change.  Plan of care communicated with patient                  Review of Systems   Constitutional, HEENT, cardiovascular, pulmonary, gi and gu systems are negative, except as otherwise noted.      Objective    /88   Pulse 96   Temp 97.5  F (36.4  C) (Temporal)   Resp 14   Wt 82.3 kg (181 lb 8 oz)   SpO2 96%   BMI 28.01 kg/m    Body mass index is 28.01 kg/m .  Physical Exam   GENERAL: healthy, alert and no distress  NECK: no adenopathy, no asymmetry, masses, or scars and thyroid normal to palpation  RESP: lungs clear to auscultation - no rales, rhonchi or wheezes  CV: regular rate and rhythm, normal S1 S2, no S3 or S4, no murmur, click or rub, no peripheral edema and peripheral pulses strong  ABDOMEN: soft, nontender, no hepatosplenomegaly, no masses and bowel sounds normal  MS: no gross musculoskeletal defects noted, no edema  NEURO: sensory exam grossly normal, mentation intact, speech normal, he does search for words infrequently during the history portion of her exam today cranial nerves 2-12 intact and gait abnormal: He is able to climb onto exam table unassisted though his gait is not entirely smooth, he ambulates with use of walker  PSYCH: mentation appears normal, affect normal/bright

## 2021-01-20 ENCOUNTER — OFFICE VISIT (OUTPATIENT)
Dept: FAMILY MEDICINE | Facility: OTHER | Age: 46
End: 2021-01-20
Payer: COMMERCIAL

## 2021-01-20 ENCOUNTER — TELEPHONE (OUTPATIENT)
Dept: FAMILY MEDICINE | Facility: OTHER | Age: 46
End: 2021-01-20

## 2021-01-20 VITALS
BODY MASS INDEX: 28.01 KG/M2 | RESPIRATION RATE: 14 BRPM | OXYGEN SATURATION: 96 % | HEART RATE: 96 BPM | DIASTOLIC BLOOD PRESSURE: 88 MMHG | WEIGHT: 181.5 LBS | TEMPERATURE: 97.5 F | SYSTOLIC BLOOD PRESSURE: 128 MMHG

## 2021-01-20 DIAGNOSIS — I69.393 ATAXIA DUE TO RECENT CEREBRAL INFARCTION: ICD-10-CM

## 2021-01-20 DIAGNOSIS — I77.74 VERTEBRAL ARTERY DISSECTION (H): Primary | ICD-10-CM

## 2021-01-20 DIAGNOSIS — R06.6 HICCUPS: ICD-10-CM

## 2021-01-20 DIAGNOSIS — I63.442 CEREBRAL INFARCTION DUE TO EMBOLISM OF LEFT CEREBELLAR ARTERY (H): ICD-10-CM

## 2021-01-20 DIAGNOSIS — G46.3 STROKE, WALLENBERG'S SYNDROME: ICD-10-CM

## 2021-01-20 DIAGNOSIS — R51.9 NONINTRACTABLE HEADACHE, UNSPECIFIED CHRONICITY PATTERN, UNSPECIFIED HEADACHE TYPE: ICD-10-CM

## 2021-01-20 PROCEDURE — 99214 OFFICE O/P EST MOD 30 MIN: CPT | Performed by: PHYSICIAN ASSISTANT

## 2021-01-20 RX ORDER — BACLOFEN 5 MG/1
5 TABLET ORAL EVERY 8 HOURS PRN
COMMUNITY
End: 2021-01-20

## 2021-01-20 RX ORDER — BACLOFEN 5 MG/1
5-10 TABLET ORAL 3 TIMES DAILY
Qty: 30 TABLET | Refills: 1 | Status: SHIPPED | OUTPATIENT
Start: 2021-01-20 | End: 2021-09-22

## 2021-01-20 RX ORDER — HYDROMORPHONE HYDROCHLORIDE 2 MG/1
2 TABLET ORAL EVERY 4 HOURS PRN
COMMUNITY
Start: 2021-01-17 | End: 2021-01-20

## 2021-01-20 RX ORDER — ACETAMINOPHEN 500 MG
1000 TABLET ORAL
COMMUNITY

## 2021-01-20 RX ORDER — CLOPIDOGREL BISULFATE 75 MG/1
75 TABLET ORAL EVERY MORNING
COMMUNITY
Start: 2021-01-17 | End: 2021-09-22

## 2021-01-20 RX ORDER — HYDROMORPHONE HYDROCHLORIDE 2 MG/1
2 TABLET ORAL EVERY 4 HOURS PRN
Qty: 10 TABLET | Refills: 0 | Status: SHIPPED | OUTPATIENT
Start: 2021-01-20 | End: 2021-09-22

## 2021-01-20 RX ORDER — CHLORPROMAZINE HYDROCHLORIDE 50 MG/1
50 TABLET, FILM COATED ORAL 3 TIMES DAILY
COMMUNITY
End: 2021-09-22

## 2021-01-20 NOTE — TELEPHONE ENCOUNTER
Pharmacy Drug change request    Baclofen (LIORESAL) 5 MG tablet    Drug not covered by patient plan.  The preferred alternative is BACLOFEN, BACLOFENPlan Help deck number. Please call/fax the pharmacy to change medication along with strength, directions, quantity and refills

## 2021-01-21 NOTE — TELEPHONE ENCOUNTER
I called to speak with pharmacist at [facility] in [place], he also is unsure regarding this though the patient did pay cash and has the medication.  He believes perhaps they only pay for 1 specific .  Okay to disregard is patient has medication   Giovana Calderon PA-C

## 2021-01-22 ENCOUNTER — HOSPITAL ENCOUNTER (OUTPATIENT)
Dept: PHYSICAL THERAPY | Facility: CLINIC | Age: 46
Setting detail: THERAPIES SERIES
End: 2021-01-22
Attending: PHYSICIAN ASSISTANT
Payer: COMMERCIAL

## 2021-01-22 ENCOUNTER — HOSPITAL ENCOUNTER (OUTPATIENT)
Dept: OCCUPATIONAL THERAPY | Facility: CLINIC | Age: 46
Setting detail: THERAPIES SERIES
End: 2021-01-22
Attending: PHYSICIAN ASSISTANT
Payer: COMMERCIAL

## 2021-01-22 DIAGNOSIS — R51.9 NONINTRACTABLE HEADACHE, UNSPECIFIED CHRONICITY PATTERN, UNSPECIFIED HEADACHE TYPE: ICD-10-CM

## 2021-01-22 DIAGNOSIS — G46.3 STROKE, WALLENBERG'S SYNDROME: ICD-10-CM

## 2021-01-22 DIAGNOSIS — R06.6 HICCUPS: ICD-10-CM

## 2021-01-22 DIAGNOSIS — I63.442 CEREBRAL INFARCTION DUE TO EMBOLISM OF LEFT CEREBELLAR ARTERY (H): ICD-10-CM

## 2021-01-22 DIAGNOSIS — I69.393 ATAXIA DUE TO RECENT CEREBRAL INFARCTION: ICD-10-CM

## 2021-01-22 DIAGNOSIS — I77.74 VERTEBRAL ARTERY DISSECTION (H): ICD-10-CM

## 2021-01-22 PROCEDURE — 97161 PT EVAL LOW COMPLEX 20 MIN: CPT | Mod: GP | Performed by: PHYSICAL THERAPIST

## 2021-01-22 PROCEDURE — 97112 NEUROMUSCULAR REEDUCATION: CPT | Mod: GP | Performed by: PHYSICAL THERAPIST

## 2021-01-22 PROCEDURE — 97165 OT EVAL LOW COMPLEX 30 MIN: CPT | Mod: GO | Performed by: OCCUPATIONAL THERAPIST

## 2021-01-22 NOTE — PROGRESS NOTES
01/22/21 1500   Quick Adds   Type of Visit Initial Outpatient Occupational Therapy Evaluation       Present No   General Information   Start Of Care Date 01/22/21   Referring Physician Giovana Calderon PA-C    Orders Evaluate and treat as indicated   Orders Date 01/20/21   Medical Diagnosis Vertebral artery dissection (H) I77.74    Onset of Illness/Injury or Date of Surgery 01/13/21   Precautions/Limitations Fall precautions  (No high stress to cervical region per report from patient )   Surgical/Medical History Reviewed Yes   Additional Occupational Profile Info/Pertinent History of Current Problem Damon Manzano is a 45 year old, right hand dominant male presenting for OP OT evaluation at the recommendation of IP therapy team post discharge from [facility] (1/13-1/17/21) concerning headache, balance impairment, memory changes and decreased activity tolerance secondary to bilateral vertebral artery dissection with associated acute infarct of the left PICA territory.    Role/Living Environment   Current Community Support Family/friend caregiver  (Wife Tiesha (has 3 children- one living at home with them)  )   Patient role/Employment history Employed  (Self-employed (owns construction company) )   Community/Avocational Activities Fishing, owns a cabin up [place]     Current Living Environment House   Home/Community Accessibility Comments Patient endorses no environmental barriers within his home or cabin.  He is currently working some/in a limited capacity and not currently driving.    Prior Level - Transfers Independent   Prior Level - Ambulation Independent   Prior Level - ADLS Independent   Prior Responsibilities - IADL Meal Preparation;Housekeeping;Laundry;Shopping;Yardwork;Medication management;Finances;Driving;Work   Prior Level Comments Patient endorses complete independence in ADL and higher order IADL's at baseline with management/oversight role in his own business.     Current  Assistive Devices - Mobility Front wheeled walker   Role/Living Environment Comments Patient lives with his wife (Tiesha) and has 3 children (1 currently still resides in the home). Since his hospitalization patient endorses some increased fatigue, although indicates that every day seems to get be getting more routined and predictable.  Patient endorses participating in his business ventures in a more limited capacity with less physically taxing involvement.     Patient/family Goals Statement Drive, get back to baseline in terms of my career and business (physically and cognitively)    Pain   Patient currently in pain Yes   Pain location Head and neck    Pain description Heaviness;Ache;Throbbing   Fall Risk Screen   Fall screen completed by OT   Have you fallen 2 or more times in the past year? No   Have you fallen and had an injury in the past year? No   Is patient a fall risk? Yes;Department fall risk interventions implemented   Fall screen comments PT currently following    Abuse Screen (yes response referral indicated)   Feels Unsafe at Home or Work/School no   Feels Threatened by Someone no   Does Anyone Try to Keep You From Having Contact with Others or Doing Things Outside Your Home? no   Physical Signs of Abuse Present no   Cognitive Status Examination   Orientation Orientation to person, place and time   Level of Consciousness Alert   Follows Commands and Answers Questions 100% of the time;Able to follow multistep instructions   Personal Safety and Judgment Intact  (Self limiting driving )   Memory Impaired   Memory Comments 0/5 spontaneous recall on MOCA    Attention No deficits were identified   Organization/Problem Solving No deficits were identified   Executive Function No deficits were identified   Cognitive Comment 25/30 on MOCA with deficits in memory only.     Visual Perception   Visual Perception No deficits were identified   Visual Perception Comments Dynavision scores: Mode A-59, Mode B(1 sec):  59, Mode B (1 sec, 2#): 58 (10/10)    Sensation   Upper Extremity Sensory Examination No deficits were identified   Range of Motion (ROM)   ROM Quick Adds No deficits identified   ROM Comments UE ROM WNL in all planes    Strength   Strength No deficits were identified   Hand Strength   Hand Dominance Right   Left Hand  (pounds) 104 pounds   Right Hand  (pounds) 105 pounds   Muscle Tone   Muscle Tone No deficits were identified   Coordination   Upper Extremity Coordination No deficits were identified   Left Hand, Nine Hole Peg Test (seconds) 23    Right Hand, Nine Hole Peg Test (seconds) 24    Transfer Skill   Level of Grand: Transfers independent   Bathing   Level of Grand - Bathing independent   Upper Body Dressing   Level of Grand: Dress Upper Body independent   Lower Body Dressing   Level of Grand: Dress Lower Body independent   Toileting   Level of Grand: Toilet independent   Grooming   Level of Grand: Grooming independent   Eating/Self-Feeding   Level of Grand: Eating independent   Activity Tolerance   Activity Tolerance Patient endorses changes in activity tolerance post dissection and stroke including increased tendency towards naps- although does also report that he has been noting more of a return to baseline functioning in terms of activity and function.     Instrumental Activities of Daily Living Assessment   IADL Quick Adds Community Mobility;Communication/Computer Use   Communication/Computer Use Has been looking at spreadsheets for work, some visual fatigue noted with prolonged screen time.    Community Mobility Is not currently driving- following recommendations from MD post hospitalization    Planned Therapy Interventions   Planned Therapy Interventions IADL training;Strengthening;Self care/Home management;Community/work reintegration   Adult OT Eval Goals   OT Eval Goals (Adult) 1;2    OT Goal 1   Goal Identifier Memory Strategies    Goal  Description Patient will demonstrate utilization of internal memory strategies as evidenced by independent recitiation of a novel 10 word list after 5+ minute time lapse as needed for return to higher order cognitive tasks.    Target Date 02/21/21    OT Goal 2   Goal Identifier Fatigue Management    Goal Description Patient will endorse understanding of fatigue management strategies as evidence by a score of less than 30 on the Fatigue Severity Scale.     Target Date 02/21/21   Clinical Impression   Criteria for Skilled Therapeutic Interventions Met Yes, treatment indicated   OT Diagnosis Decreased ease and efficiency in IADL's    Influenced by the following impairments balance impairment, memory impairment and lower activity tolerance    Assessment of Occupational Performance 1-3 Performance Deficits   Identified Performance Deficits higher order work related tasks    Clinical Decision Making (Complexity) Low complexity   Therapy Frequency 1x/week    Predicted Duration of Therapy Intervention (days/wks) 4 weeks    Risks and Benefits of Treatment have been explained. Yes   Patient, Family & other staff in agreement with plan of care Yes   Education Assessment   Barriers To Learning No Barriers   Preferred Learning Style Demonstration;Listening;Pictures/video   Total Evaluation Time   OT Eval, Low Complexity Minutes ([id number]) 45       Thank you for referring Damon to Occupational Therapy,     Krystal TUCKER

## 2021-01-26 ENCOUNTER — HOSPITAL ENCOUNTER (OUTPATIENT)
Dept: PHYSICAL THERAPY | Facility: CLINIC | Age: 46
Setting detail: THERAPIES SERIES
End: 2021-01-26
Attending: PHYSICIAN ASSISTANT
Payer: COMMERCIAL

## 2021-01-26 ENCOUNTER — HOSPITAL ENCOUNTER (OUTPATIENT)
Dept: OCCUPATIONAL THERAPY | Facility: CLINIC | Age: 46
Setting detail: THERAPIES SERIES
End: 2021-01-26
Attending: PHYSICIAN ASSISTANT
Payer: COMMERCIAL

## 2021-01-26 PROCEDURE — 97112 NEUROMUSCULAR REEDUCATION: CPT | Mod: GP | Performed by: PHYSICAL THERAPIST

## 2021-01-26 PROCEDURE — 97530 THERAPEUTIC ACTIVITIES: CPT | Mod: GO | Performed by: OCCUPATIONAL THERAPIST

## 2021-01-26 PROCEDURE — 97535 SELF CARE MNGMENT TRAINING: CPT | Mod: GO | Performed by: OCCUPATIONAL THERAPIST

## 2021-01-26 PROCEDURE — 97750 PHYSICAL PERFORMANCE TEST: CPT | Mod: GP | Performed by: PHYSICAL THERAPIST

## 2021-01-26 NOTE — PROGRESS NOTES
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 He feels his balance and gait are improving. His head feels heavy and there is a strain in the head and neck he feels is affecting his balance. He is using the front wheeled walker intermittently at home and not falling to the L as much. His arms and hand AROM are full. He is completing leg stretches and feels the L posterior chain is tight. There is a constant semi falling asleep feeling.    Prior level of function comment extermely active with work, children and family and has a cabin he has been working on. He is always busy.    Diagnostic Tests   (not available at this time from time of CVA)   Previous/Current Treatment Physical Therapy;Occupational Therapy  (in hospital - just starting outpatient PT and OT)   Improvement after PT Moderate   Improvement after OT Moderate   Current Community Support Family/friend caregiver   Patient role/Employment history Employed  (self employed construction, in field/desk work)   Living  environment House/[facility]   Home/Community Accessibility Comments no concerns at this time   Current Assistive Devices Front Wheeled Walker   Patient/Family Goals Statement Get back to work even in field and other daily activities - walking and balance, strength of legs main issues   General Information Comments History: HTN, anxiety, fall risk especially wtihout AD   Fall Risk Screen   Fall screen completed by PT   Have you fallen 2 or more times in the past year? No   Have you fallen and had an injury in the past year? No   Is patient a fall risk? Yes;Department fall risk interventions implemented   Fall screen comments Due to balance and gait issues after CVA   Abuse Screen (yes response referral indicated)   Feels Unsafe at Home or Work/School no   Feels Threatened by Someone no   Does Anyone Try to Keep You From Having Contact with Others or Doing Things Outside Your Home? no   Physical Signs of Abuse Present no   Pain   Patient currently in pain No  (tightness posterior L leg)   Cognitive Status Examination   Orientation orientation to person, place and time   Level of Consciousness alert   Follows Commands and Answers Questions 100% of the time   Personal Safety and Judgment intact   Cognitive Comment Cognitive assessment with OT, no obvious issues   Observation   Observation no acute distress noted during subjective   Integumentary   Integumentary Comments negative   Posture   Posture Comments fair   Transfer Skills   Transfer Comments independent and tends to leave walker behind with short distances - needs support for safety at this time.    Gait   Gait Comments Walking with walker and is independent,   Balance   Balance Comments able to stand double leg x 30 seconds with minimal sway and he is aware of this. No loss of balance. Tandem standing hard to place feet and falls to the left with leg placement bilaterally.    Planned Therapy Interventions   Planned Therapy Interventions balance training;gait  training;motor coordination training;neuromuscular re-education;ROM;strengthening;stretching;transfer training   Clinical Impression   Criteria for Skilled Therapeutic Interventions Met yes, treatment indicated   PT Diagnosis Decreased balance, ability to walk and leg coordination following CVA   Influenced by the following impairments CVA, artery disccation, poor balance,    Functional limitations due to impairments walking without AD, transfers, uneven ground activities and ladders for work   Clinical Presentation Stable/Uncomplicated   Clinical Presentation Rationale based on clinical judgement   Clinical Decision Making (Complexity) Low complexity   Predicted Duration of Therapy Intervention (days/wks) 2 times per week x 6 weeks   Risk & Benefits of therapy have been explained Yes   Patient, Family & other staff in agreement with plan of care Yes   Clinical Impression Comments 46 yo male with CVA and vertebral artery diseccation. He is feeling he has been improving and is not always using the walker. He has been hampered by loss of balance to the L and understand he is not to turn neck too fast or far without trunk following. He is trying to stay on a schedule and reports fatigue he feels is from the medication. He has good home support.    Education Assessment   Preferred Learning Style Listening;Reading;Demonstration   Barriers to Learning No barriers  (See OT evaluation which will be completed ater today)   GOALS   PT Eval Goals 1;2   Goal 1   Goal Identifier Walking   Goal Description Damon will be able to walk safely and with normal gait pattern 1 x 200 feet x 1    Target Date 04/01/21   Goal 2   Goal Identifier Balance   Goal Description Damon will be able to tandem stand x 30 seconds 1 hand support->no support with fair balance bilaterally to progress toward his goal of getting back on ladders and walking on uneven ground for work.    Target Date 04/01/21   Total Evaluation Time   PT Eval, Low Complexity  Minutes ([id number]) 87

## 2021-01-27 NOTE — PROGRESS NOTES
Functional Gait Assessment (FGA): The FGA assesses postural stability during various walking tasks.   Gait assistive device used: None    Patient Score: 16/30  Scores of <22/30 have been correlated with predicting falls in community-dwelling older adults according to Marlen & Jack 2010.   Scores of <18/30 have been correlated with increased risk for falls in patients with Parkinsons Disease according to Georgi Baekr Zhou et al 2014.  Minimal Detectable Change for patients with acute/chronic stroke = 4.2 according to Bam & Jazmin 2009  Minimal Detectable Change for patients with vestibular disorder = 8 according to Marlen & Jack 2010    Assessment (rationale for performing, application to patient s function & care plan): to determine areas of concern for balance and proper assist device, baseline  Minutes billed as physical performance test: 23

## 2021-01-28 ENCOUNTER — HOSPITAL ENCOUNTER (OUTPATIENT)
Dept: PHYSICAL THERAPY | Facility: CLINIC | Age: 46
Setting detail: THERAPIES SERIES
End: 2021-01-28
Attending: PHYSICIAN ASSISTANT
Payer: COMMERCIAL

## 2021-01-28 PROCEDURE — 97112 NEUROMUSCULAR REEDUCATION: CPT | Mod: GP | Performed by: PHYSICAL THERAPIST

## 2021-01-28 PROCEDURE — 97110 THERAPEUTIC EXERCISES: CPT | Mod: GP | Performed by: PHYSICAL THERAPIST

## 2021-02-02 ENCOUNTER — HOSPITAL ENCOUNTER (OUTPATIENT)
Dept: OCCUPATIONAL THERAPY | Facility: CLINIC | Age: 46
Setting detail: THERAPIES SERIES
End: 2021-02-02
Attending: PHYSICIAN ASSISTANT
Payer: COMMERCIAL

## 2021-02-02 ENCOUNTER — HOSPITAL ENCOUNTER (OUTPATIENT)
Dept: PHYSICAL THERAPY | Facility: CLINIC | Age: 46
Setting detail: THERAPIES SERIES
End: 2021-02-02
Attending: PHYSICIAN ASSISTANT
Payer: COMMERCIAL

## 2021-02-02 PROCEDURE — 97750 PHYSICAL PERFORMANCE TEST: CPT | Mod: GP | Performed by: PHYSICAL THERAPIST

## 2021-02-02 PROCEDURE — 97110 THERAPEUTIC EXERCISES: CPT | Mod: GP | Performed by: PHYSICAL THERAPIST

## 2021-02-02 PROCEDURE — 97112 NEUROMUSCULAR REEDUCATION: CPT | Mod: GP | Performed by: PHYSICAL THERAPIST

## 2021-02-02 PROCEDURE — 97530 THERAPEUTIC ACTIVITIES: CPT | Mod: GO

## 2021-02-02 NOTE — PROGRESS NOTES
Functional Gait Assessment (FGA): The FGA assesses postural stability during various walking tasks.   Gait assistive device used: None    Patient Score: 28/30  Scores of <22/30 have been correlated with predicting falls in community-dwelling older adults according to Marlen & Jack 2010.   Scores of <18/30 have been correlated with increased risk for falls in patients with Parkinsons Disease according to Georgi Baker Zhou et al 2014.  Minimal Detectable Change for patients with acute/chronic stroke = 4.2 according to Bam & Jazmin 2009  Minimal Detectable Change for patients with vestibular disorder = 8 according to Marlen & Jack 2010    Assessment (rationale for performing, application to patient s function & care plan): He has had significant improvement overall and we wanted to better understand how much he improved to continue to advance higher level skills.   Minutes billed as physical performance test: 15 minutes

## 2021-02-04 ENCOUNTER — HOSPITAL ENCOUNTER (OUTPATIENT)
Dept: PHYSICAL THERAPY | Facility: CLINIC | Age: 46
Setting detail: THERAPIES SERIES
End: 2021-02-04
Attending: PHYSICIAN ASSISTANT
Payer: COMMERCIAL

## 2021-02-04 PROCEDURE — 97112 NEUROMUSCULAR REEDUCATION: CPT | Mod: GP | Performed by: PHYSICAL THERAPIST

## 2021-02-09 ENCOUNTER — HOSPITAL ENCOUNTER (OUTPATIENT)
Dept: PHYSICAL THERAPY | Facility: CLINIC | Age: 46
Setting detail: THERAPIES SERIES
End: 2021-02-09
Attending: PHYSICIAN ASSISTANT
Payer: COMMERCIAL

## 2021-02-09 PROCEDURE — 97110 THERAPEUTIC EXERCISES: CPT | Mod: GP | Performed by: PHYSICAL THERAPIST

## 2021-02-09 PROCEDURE — 97112 NEUROMUSCULAR REEDUCATION: CPT | Mod: GP | Performed by: PHYSICAL THERAPIST

## 2021-02-22 ENCOUNTER — TELEPHONE (OUTPATIENT)
Dept: FAMILY MEDICINE | Facility: OTHER | Age: 46
End: 2021-02-22

## 2021-02-22 NOTE — TELEPHONE ENCOUNTER
Form placed in NP box for review/signature.  Mariajose Diaz CMA ([facility])

## 2021-02-22 NOTE — TELEPHONE ENCOUNTER
Reason for Call:  Form, our goal is to have forms completed with 72 hours, however, some forms may require a visit or additional information.    Type of letter, form or note:  disability    Who is the form from?: Patient    Where did the form come from: Patient or family brought in       What clinic location was the form placed at?: [facility] - [phone]    Where the form was placed: Team B Box/Folder    What number is listed as a contact on the form?: [phone]       Additional comments: Patient would like a call prior to the paperwork being filled out. At the time of the call patient states he will have either a fax number to send it to, or will pick it up. Please advise.    Call taken on 2/22/2021 at 9:04 AM by Yessy Alvarado

## 2021-02-23 NOTE — TELEPHONE ENCOUNTER
Spoke to Damon, it is best for neurology to complete these form.  pt or his wife will come in to  form, form placed in files for  at   Giovana Calderon PA-C

## 2021-02-25 ENCOUNTER — HOSPITAL ENCOUNTER (OUTPATIENT)
Dept: PHYSICAL THERAPY | Facility: CLINIC | Age: 46
Setting detail: THERAPIES SERIES
End: 2021-02-25
Attending: PHYSICIAN ASSISTANT
Payer: COMMERCIAL

## 2021-02-25 PROCEDURE — 97110 THERAPEUTIC EXERCISES: CPT | Mod: GP | Performed by: PHYSICAL THERAPIST

## 2021-02-25 NOTE — PROGRESS NOTES
Outpatient Physical Therapy Progress Note     Patient: Junior Manzano  : 1975    Beginning/End Dates of Reporting Period:  6 total sessions between 2021 and 2021    Referring Provider: Giovana Calderon PAC    Therapy Diagnosis: Decreased balance, ability to walk and leg coordination following CVA     Client Self Report: Damon just returned from [place]. He [place] on the beach up to 4 miles. He reports he is done with OT and he is thinking he can continue with PT home program and gradually work into his previous activities based on his provider's recommendations. He is continuing to note issues to his left with walking and loss of balance, however, he notes this has improved.    Objective Measurements:  Objective Measure: Functional Gait Assessment   Details:   Objective Measure: JPET for cervical proprioception  Details: within one inch of center and target overall.         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 (being met, still loses balance with L turning, walks to L )   Target Date 21   Date Met      Progress:     Goal Identifier Balance   Goal Description Damon will be able to balance on narrow steps x 30 seconds bilaterally to improve balance to work toward negotiating steep steps or ladders safely for return to field work (will monitor closely due to issues with balance)(reports climbed ladder without issues)   Target Date 21   Date Met      Progress:     Progress Toward Goals:   Progress this reporting period: Damon is progressing well and he reports he is still lossing balance to the L and feels he needs strength. He reports he has climbed a ladder carefully and 8-10 feet x 1 without issues and has been walking on sand some veering to the L. Recommend skilled intervention for core trunk and general  progrssion into strengthening.     Plan:  Continue therapy per current plan of care.    Discharge:  No

## 2021-03-18 NOTE — PROGRESS NOTES
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 Stated \"it was hard to get back into the swing of things\". Did not bring walker to session. Reports not using it at home.      Objective Measurements:  See status below     Goals:     Goal Identifier  Memory Strategies   Goal Description  Patient will demonstrate utilization of internal memory strategies as evidenced by independent recitiation of a novel 10 word list after 5+ minute time lapse as needed for return to higher order cognitive tasks.    Target Date  21   Date Met     Progress:Not met     Goal Identifier  Fatigue Management    Goal Description  Patient will endorse understanding of fatigue management strategies as evidence by a score of less than 30 on the Fatigue Severity Scale.   Target Date  21   Date Met      Progress: goal not met.       Progress Toward Goals:   Pt was making progress towards goals x3 appointment sessions. Pt had previously canceled x appointment and has not scheduled further appointments for OT. At this time, OT will discharge pt.     Plan:  Discharge from therapy.    Discharge:    Reason for Discharge: Patient has failed to schedule further appointments.    Equipment Issued: n/a    Discharge Plan: Patient to continue home program.  "

## 2021-05-07 NOTE — DISCHARGE SUMMARY
Outpatient Physical Therapy Discharge Note     Patient: Junior Manzano  : 1975    Beginning/End Dates of Reporting Period: 6 total sessions between 2021 and 2021      Referring Provider: Giovana Calderon PAC    Therapy Diagnosis: Decreased balance, ability to walk and leg coordination following CVA          Client Self Report: Reports he is returning to work and progressing well. Feels balance and strength are improved and he no longer needs formal PT.     Objective Measurements:  Refer to 2021 note for details.     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    Plan:  Discharge from therapy.    Discharge:    Reason for Discharge: Patient has met all goals.    Equipment Issued: band    Discharge Plan: Patient to continue home program.

## 2021-05-25 NOTE — PATIENT INSTRUCTIONS
Preventive Health Recommendations  Male Ages 40 to 49    Yearly exam:             See your health care provider every year in order to  o   Review health changes.   o   Discuss preventive care.    o   Review your medicines if your doctor has prescribed any.    You should be tested each year for STDs (sexually transmitted diseases) if you re at risk.     Have a cholesterol test every 5 years.     Have a colonoscopy (test for colon cancer) if someone in your family has had colon cancer or polyps before age 50.     After age 45, have a diabetes test (fasting glucose). If you are at risk for diabetes, you should have this test every 3 years.      Talk with your health care provider about whether or not a prostate cancer screening test (PSA) is right for you.    Shots: Get a flu shot each year. Get a tetanus shot every 10 years.     Nutrition:    Eat at least 5 servings of fruits and vegetables daily.     Eat whole-grain bread, whole-wheat pasta and brown rice instead of white grains and rice.     Get adequate Calcium and Vitamin D.     Lifestyle    Exercise for at least 150 minutes a week (30 minutes a day, 5 days a week). This will help you control your weight and prevent disease.     Limit alcohol to one drink per day.     No smoking.     Wear sunscreen to prevent skin cancer.     See your dentist every six months for an exam and cleaning.       Magdalena Gallegos RN

## 2021-08-10 ENCOUNTER — MYC MEDICAL ADVICE (OUTPATIENT)
Dept: FAMILY MEDICINE | Facility: OTHER | Age: 46
End: 2021-08-10

## 2021-08-11 NOTE — TELEPHONE ENCOUNTER
Spouse calling to follow up on this message in regards to getting the Covid vaccine, [employer]co [employer]. They need to complete today. Please advise. Estefania Leyva LPN

## 2021-09-22 ENCOUNTER — OFFICE VISIT (OUTPATIENT)
Dept: FAMILY MEDICINE | Facility: OTHER | Age: 46
End: 2021-09-22
Payer: COMMERCIAL

## 2021-09-22 VITALS
HEART RATE: 79 BPM | RESPIRATION RATE: 16 BRPM | WEIGHT: 199.8 LBS | OXYGEN SATURATION: 99 % | HEIGHT: 68 IN | TEMPERATURE: 96.8 F | BODY MASS INDEX: 30.28 KG/M2 | DIASTOLIC BLOOD PRESSURE: 80 MMHG | SYSTOLIC BLOOD PRESSURE: 122 MMHG

## 2021-09-22 DIAGNOSIS — Z00.00 ROUTINE GENERAL MEDICAL EXAMINATION AT A HEALTH CARE FACILITY: Primary | ICD-10-CM

## 2021-09-22 DIAGNOSIS — Z11.59 NEED FOR HEPATITIS C SCREENING TEST: ICD-10-CM

## 2021-09-22 DIAGNOSIS — Z23 NEED FOR VACCINATION: ICD-10-CM

## 2021-09-22 DIAGNOSIS — Z13.6 CARDIOVASCULAR SCREENING; LDL GOAL LESS THAN 160: ICD-10-CM

## 2021-09-22 DIAGNOSIS — Z12.5 SCREENING FOR PROSTATE CANCER: ICD-10-CM

## 2021-09-22 DIAGNOSIS — I10 HYPERTENSION GOAL BP (BLOOD PRESSURE) < 140/90: ICD-10-CM

## 2021-09-22 DIAGNOSIS — I77.74 VERTEBRAL ARTERY DISSECTION (H): ICD-10-CM

## 2021-09-22 DIAGNOSIS — Z11.4 SCREENING FOR HIV (HUMAN IMMUNODEFICIENCY VIRUS): ICD-10-CM

## 2021-09-22 PROCEDURE — 90472 IMMUNIZATION ADMIN EACH ADD: CPT | Performed by: PHYSICIAN ASSISTANT

## 2021-09-22 PROCEDURE — 90686 IIV4 VACC NO PRSV 0.5 ML IM: CPT | Performed by: PHYSICIAN ASSISTANT

## 2021-09-22 PROCEDURE — 99396 PREV VISIT EST AGE 40-64: CPT | Mod: 25 | Performed by: PHYSICIAN ASSISTANT

## 2021-09-22 PROCEDURE — 90714 TD VACC NO PRESV 7 YRS+ IM: CPT | Performed by: PHYSICIAN ASSISTANT

## 2021-09-22 PROCEDURE — 90471 IMMUNIZATION ADMIN: CPT | Performed by: PHYSICIAN ASSISTANT

## 2021-09-22 RX ORDER — ASPIRIN 81 MG/1
TABLET, CHEWABLE ORAL
COMMUNITY
Start: 2021-09-05

## 2021-09-22 ASSESSMENT — ENCOUNTER SYMPTOMS
PARESTHESIAS: 0
HEARTBURN: 1
NAUSEA: 0
SORE THROAT: 0
CHILLS: 0
PALPITATIONS: 0
FREQUENCY: 0
DIZZINESS: 0
ABDOMINAL PAIN: 0
ARTHRALGIAS: 1
HEMATURIA: 0
SHORTNESS OF BREATH: 0
COUGH: 1
DIARRHEA: 0
EYE PAIN: 0
HEMATOCHEZIA: 0
NERVOUS/ANXIOUS: 1
HEADACHES: 0
CONSTIPATION: 0
WEAKNESS: 0
JOINT SWELLING: 0
DYSURIA: 0
MYALGIAS: 1

## 2021-09-22 ASSESSMENT — PAIN SCALES - GENERAL: PAINLEVEL: NO PAIN (0)

## 2021-09-22 ASSESSMENT — MIFFLIN-ST. JEOR: SCORE: 1743.17

## 2021-09-22 NOTE — PROGRESS NOTES
SUBJECTIVE:   CC: Junior Manzano is an 46 year old male who presents for preventative health visit.   Patient has been advised of split billing requirements and indicates understanding: Yes  Healthy Habits:     Getting at least 3 servings of Calcium per day:  Yes    Bi-annual eye exam:  Yes    Dental care twice a year:  Yes    Sleep apnea or symptoms of sleep apnea:  None    Diet:  Regular (no restrictions)    Frequency of exercise:  2-3 days/week    Duration of exercise:  15-30 minutes    Taking medications regularly:  No    Medication side effects:  Not applicable    PHQ-2 Total Score: 0    Additional concerns today:  No    He has an erythematous red ragini on his right cheek near his ear it has been there for a few months.  He has been applying a Ness product.  Might be helping a bit but it is never completely gone away.  It does feel irritated like a cut almost.    Today's PHQ-2 Score:   PHQ-2 ( 1999 Pf[employer]) 9/22/2021   Q1: Little interest or pleasure in doing things 0   Q2: Feeling down, depressed or hopeless 0   PHQ-2 Score 0   Q1: Little interest or pleasure in doing things Not at all   Q2: Feeling down, depressed or hopeless Not at all   PHQ-2 Score 0     Abuse: Current or Past(Physical, Sexual or Emotional)- No  Do you feel safe in your environment? Yes  Have you ever done Advance Care Planning? (For example, a Health Directive, POLST, or a discussion with a medical provider or your loved ones about your wishes): No, advance care planning information given to patient to review.  Patient plans to discuss their wishes with loved ones or provider.      Social History     Tobacco Use     Smoking status: Never Smoker     Smokeless tobacco: Never Used   Substance Use Topics     Alcohol use: Yes     Alcohol/week: 5.0 standard drinks         Alcohol Use 9/22/2021   Prescreen: >3 drinks/day or >7 drinks/week? Yes   Prescreen: >3 drinks/day or >7 drinks/week? -   AUDIT SCORE  5       Last PSA: No results found  for: PSA    Reviewed orders with patient. Reviewed health maintenance and updated orders accordingly - Yes  Labs reviewed in EPIC  BP Readings from Last 3 Encounters:   09/22/21 122/80   01/20/21 128/88   09/14/20 124/84    Wt Readings from Last 3 Encounters:   09/22/21 90.6 kg (199 lb 12.8 oz)   01/20/21 82.3 kg (181 lb 8 oz)   09/14/20 86.2 kg (190 lb)                  Patient Active Problem List   Diagnosis     Sprain and strain of unspecified site of knee and leg     Abdominal pain, right lower quadrant     Generalized anxiety disorder     CARDIOVASCULAR SCREENING; LDL GOAL LESS THAN 160     Incarcerated umbilical hernia     Hypertension goal BP (blood pressure) < 140/90     Lipoma of torso     Vertebral artery dissection (H)     Stroke, Wallenberg's syndrome     Cerebral infarction due to embolism of left cerebellar artery (H)     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 Maternal Grandfather         age 67 MI     Other Cancer Father          Current Outpatient Medications   Medication Sig Dispense Refill     acetaminophen (TYLENOL) 500 MG tablet Take 1,000 mg by mouth       aspirin (ASA) 81 MG chewable tablet CHEW AND SWALLOW 1 TABLET BY MOUTH DAILY       Allergies   Allergen Reactions     Banana      Throat feels swollen.Also cantelope- same feeling.     Flonase [Fluticasone Propionate] Difficulty breathing       Reviewed and updated as needed this visit by clinical staff  Tobacco  Allergies  Meds   Med Hx  Surg Hx  Fam Hx  Soc Hx        Reviewed and updated as needed this visit  "by Provider                    Review of Systems   Constitutional: Negative for chills.   HENT: Negative for congestion, ear pain, hearing loss and sore throat.    Eyes: Negative for pain and visual disturbance.   Respiratory: Positive for cough. Negative for shortness of breath.    Cardiovascular: Negative for chest pain, palpitations and peripheral edema.   Gastrointestinal: Positive for heartburn. Negative for abdominal pain, constipation, diarrhea, hematochezia and nausea.   Genitourinary: Negative for discharge, dysuria, frequency, genital sores, hematuria, impotence and urgency.   Musculoskeletal: Positive for arthralgias and myalgias. Negative for joint swelling.   Skin: Negative for rash.   Neurological: Negative for dizziness, weakness, headaches and paresthesias.   Psychiatric/Behavioral: Positive for mood changes. The patient is nervous/anxious.      Throat feels scratchy on occasion, cough from allergies    Heartburn on occasion, likely certain foods he is eating. Needs to drink more water per pt. Will used omeprazole prn gets \"hot burps\"    S/P B CAROTID DISSECTIONS EARLY IN 2021 , left side of body is very tight per pt as a result of his cva, was cleared in May from a physical standpoint.  Right arm numbness with restarting exercise neuro told he it was not from his stroke, just a pinched nerve. This is treated well with massage therapy.  Still rare occasional numbness in face very much improved.    He is more short tempered than before and less patience than before. Is finding that he is isolating a bit,avoiding people,  apprehensive that he dissection may cause more complications again    OBJECTIVE:   /80   Pulse 79   Temp 96.8  F (36  C) (Temporal)   Resp 16   Ht 1.715 m (5' 7.52\")   Wt 90.6 kg (199 lb 12.8 oz)   SpO2 99%   BMI 30.81 kg/m      Physical Exam  GENERAL: healthy, alert and no distress  EYES: Eyes grossly normal to inspection, PERRL and conjunctivae and sclerae normal  HENT: " ear canals and TM's normal, nose and mouth without ulcers or lesions  NECK: no adenopathy, no asymmetry, masses, or scars and thyroid normal to palpation  RESP: lungs clear to auscultation - no rales, rhonchi or wheezes  CV: regular rate and rhythm, normal S1 S2, no S3 or S4, no murmur, click or rub, no peripheral edema and peripheral pulses strong  ABDOMEN: soft, nontender, no hepatosplenomegaly, no masses and bowel sounds normal  MS: no gross musculoskeletal defects noted, no edema  SKIN: Erythematous macule approximately three millimeters in diameter in the preauricular region it does not dennis there is no raised borders but it does appear vascular  NEURO: Normal strength and tone, mentation intact and speech normal  PSYCH: mentation appears normal, affect normal/bright    Diagnostic Test Results:  Labs reviewed in Epic  none     ASSESSMENT/PLAN:   (Z00.00) Routine general medical examination at a health care facility  (primary encounter diagnosis)  Comment: Recommend routine annual visits  Plan: He will see dermatology in [place], he and his wife are moving to [place] for the winter, in the meantime he will apply hydrocortisone    (I10) Hypertension goal BP (blood pressure) < 140/90  Comment: at goal off meds, will continue to monitor  Plan: BASIC METABOLIC PANEL            (Z11.59) Need for hepatitis C screening test  Comment:   Plan: declined    (Z11.4) Screening for HIV (human immunodeficiency virus)  Comment: declined  Plan:     (Z13.6) CARDIOVASCULAR SCREENING; LDL GOAL LESS THAN 160  Comment:   Plan: Lipid panel reflex to direct LDL Fasting        Historically has had excellent cholesterol    (Z23) Need for vaccination  Comment: Updated  Plan: TD PRSERV FREE >=7 YRS ADS IM [[id number]]            (Z12.5) Screening for prostate cancer  Comment:   Plan: PSA, screen          (I77.74) Vertebral artery dissection (H)  Comment: Has been following with neurology, no longer has any physical restrictions  Plan:  "Continues to take an aspirin a day    Patient has been advised of split billing requirements and indicates understanding: Yes  COUNSELING:   Reviewed preventive health counseling, as reflected in patient instructions    Estimated body mass index is 30.81 kg/m  as calculated from the following:    Height as of this encounter: 1.715 m (5' 7.52\").    Weight as of this encounter: 90.6 kg (199 lb 12.8 oz).     Not addressed    He reports that he has never smoked. He has never used smokeless tobacco.      Counseling Resources:  ATP IV Guidelines  Pooled Cohorts Equation Calculator  FRAX Risk Assessment  ICSI Preventive Guidelines  Dietary Guidelines for Americans, 2010  USDA's MyPlate  ASA Prophylaxis  Lung CA Screening    Giovana Calderon PA-C  [facility]  "

## 2021-09-23 ENCOUNTER — LAB (OUTPATIENT)
Dept: LAB | Facility: OTHER | Age: 46
End: 2021-09-23
Payer: COMMERCIAL

## 2021-09-23 DIAGNOSIS — Z13.6 CARDIOVASCULAR SCREENING; LDL GOAL LESS THAN 160: ICD-10-CM

## 2021-09-23 DIAGNOSIS — I10 HYPERTENSION GOAL BP (BLOOD PRESSURE) < 140/90: ICD-10-CM

## 2021-09-23 DIAGNOSIS — Z12.5 SCREENING FOR PROSTATE CANCER: ICD-10-CM

## 2021-09-23 DIAGNOSIS — Z11.59 SCREENING FOR VIRAL DISEASE: ICD-10-CM

## 2021-09-23 LAB
ANION GAP SERPL CALCULATED.3IONS-SCNC: 3 MMOL/L (ref 3–14)
BUN SERPL-MCNC: 17 MG/DL (ref 7–30)
CALCIUM SERPL-MCNC: 8.8 MG/DL (ref 8.5–10.1)
CHLORIDE BLD-SCNC: 109 MMOL/L (ref 94–109)
CHOLEST SERPL-MCNC: 181 MG/DL
CO2 SERPL-SCNC: 28 MMOL/L (ref 20–32)
CREAT SERPL-MCNC: 1.12 MG/DL (ref 0.66–1.25)
FASTING STATUS PATIENT QL REPORTED: YES
GFR SERPL CREATININE-BSD FRML MDRD: 77 ML/MIN/1.73M2
GLUCOSE BLD-MCNC: 97 MG/DL (ref 70–99)
HDLC SERPL-MCNC: 60 MG/DL
LDLC SERPL CALC-MCNC: 110 MG/DL
NONHDLC SERPL-MCNC: 121 MG/DL
POTASSIUM BLD-SCNC: 4.5 MMOL/L (ref 3.4–5.3)
PSA SERPL-MCNC: 0.84 UG/L (ref 0–4)
SODIUM SERPL-SCNC: 140 MMOL/L (ref 133–144)
TRIGL SERPL-MCNC: 56 MG/DL

## 2021-09-23 PROCEDURE — G0103 PSA SCREENING: HCPCS

## 2021-09-23 PROCEDURE — 86769 SARS-COV-2 COVID-19 ANTIBODY: CPT | Mod: 90

## 2021-09-23 PROCEDURE — 99000 SPECIMEN HANDLING OFFICE-LAB: CPT

## 2021-09-23 PROCEDURE — 36415 COLL VENOUS BLD VENIPUNCTURE: CPT

## 2021-09-23 PROCEDURE — 80048 BASIC METABOLIC PNL TOTAL CA: CPT

## 2021-09-23 PROCEDURE — 80061 LIPID PANEL: CPT

## 2021-09-24 LAB
SARS-COV-2 AB SERPL IA-ACNC: 42 U/ML
SARS-COV-2 AB SERPL QL IA: POSITIVE

## 2021-09-24 NOTE — RESULT ENCOUNTER NOTE
Mr. Manzano    I am resulting your labs as your provider is out of office.  Positive SARS-CoV-2 antibody showing signs of immunity to COVID-19    If you have any questions or concerns please contact me via My Chart or call the clinic at [phone]     Thank You  Beth Rubalcava MD.

## 2021-11-05 ENCOUNTER — E-VISIT (OUTPATIENT)
Dept: FAMILY MEDICINE | Facility: OTHER | Age: 46
End: 2021-11-05
Payer: COMMERCIAL

## 2021-11-05 DIAGNOSIS — J01.00 ACUTE NON-RECURRENT MAXILLARY SINUSITIS: Primary | ICD-10-CM

## 2021-11-05 PROCEDURE — 99421 OL DIG E/M SVC 5-10 MIN: CPT | Performed by: PHYSICIAN ASSISTANT

## 2021-11-05 RX ORDER — AMOXICILLIN 875 MG
875 TABLET ORAL 2 TIMES DAILY
Qty: 20 TABLET | Refills: 0 | Status: SHIPPED | OUTPATIENT
Start: 2021-11-05

## 2022-10-09 ENCOUNTER — HEALTH MAINTENANCE LETTER (OUTPATIENT)
Age: 47
End: 2022-10-09

## 2022-11-26 ENCOUNTER — HEALTH MAINTENANCE LETTER (OUTPATIENT)
Age: 47
End: 2022-11-26

## 2023-05-15 ENCOUNTER — TELEPHONE (OUTPATIENT)
Dept: FAMILY MEDICINE | Facility: CLINIC | Age: 48
End: 2023-05-15
Payer: COMMERCIAL

## 2023-05-15 NOTE — TELEPHONE ENCOUNTER
Forms/Letter Request    Type of form/letter: Medical Consultation request form    Have you been seen for this request: No    Do we have the form/letter: Yes:     Who is the form from? Dental office (if other please explain)    Where did/will the form come from? form was faxed in    When is form/letter needed by: ASAP    How would you like the form/letter returned: Fax : [phone]

## 2024-01-06 ENCOUNTER — HEALTH MAINTENANCE LETTER (OUTPATIENT)
Age: 49
End: 2024-01-06